# Patient Record
Sex: MALE | URBAN - METROPOLITAN AREA
[De-identification: names, ages, dates, MRNs, and addresses within clinical notes are randomized per-mention and may not be internally consistent; named-entity substitution may affect disease eponyms.]

---

## 2020-07-25 ENCOUNTER — TELEPHONE ENCOUNTER (OUTPATIENT)
Dept: URBAN - METROPOLITAN AREA CLINIC 13 | Facility: CLINIC | Age: 55
End: 2020-07-25

## 2020-07-25 RX ORDER — POLYETHYLENE GLYCOL 3350, SODIUM SULFATE, SODIUM CHLORIDE, POTASSIUM CHLORIDE, ASCORBIC ACID, SODIUM ASCORBATE 7.5-2.691G
USE AS DIRECTED KIT ORAL
Qty: 1 | Refills: 0 | OUTPATIENT
Start: 2013-06-25 | End: 2013-08-29

## 2020-07-26 ENCOUNTER — TELEPHONE ENCOUNTER (OUTPATIENT)
Dept: URBAN - METROPOLITAN AREA CLINIC 13 | Facility: CLINIC | Age: 55
End: 2020-07-26

## 2020-07-26 RX ORDER — LOSARTAN POTASSIUM 100 MG/1
TAKE 1 TABLET DAILY TABLET, FILM COATED ORAL
Refills: 0 | Status: ACTIVE | COMMUNITY
Start: 2013-06-25

## 2020-07-26 RX ORDER — DEXTROSE 4 G
TAKE 1 TABLET DAILY AS NEEDED TABLET,CHEWABLE ORAL
Refills: 0 | Status: ACTIVE | COMMUNITY
Start: 2013-06-25

## 2020-07-26 RX ORDER — FLUTICASONE PROPIONATE 50 UG/1
SPRAY, METERED NASAL
Qty: 16 | Refills: 0 | Status: ACTIVE | COMMUNITY
Start: 2012-08-01

## 2020-07-26 RX ORDER — ALLOPURINOL 300 MG/1
TAKE 1 TABLET DAILY TABLET ORAL
Refills: 0 | Status: ACTIVE | COMMUNITY
Start: 2013-06-25

## 2020-07-26 RX ORDER — INDOMETHACIN 25 MG/1
TAKE 1 CAPSULE 3 TIMES DAILY WITH FOOD CAPSULE ORAL
Refills: 0 | Status: ACTIVE | COMMUNITY
Start: 2013-06-25

## 2024-01-08 ENCOUNTER — OFFICE VISIT (OUTPATIENT)
Dept: URBAN - METROPOLITAN AREA CLINIC 113 | Facility: CLINIC | Age: 59
End: 2024-01-08
Payer: COMMERCIAL

## 2024-01-08 ENCOUNTER — DASHBOARD ENCOUNTERS (OUTPATIENT)
Age: 59
End: 2024-01-08

## 2024-01-08 ENCOUNTER — LAB OUTSIDE AN ENCOUNTER (OUTPATIENT)
Dept: URBAN - METROPOLITAN AREA CLINIC 113 | Facility: CLINIC | Age: 59
End: 2024-01-08

## 2024-01-08 VITALS
DIASTOLIC BLOOD PRESSURE: 80 MMHG | RESPIRATION RATE: 20 BRPM | HEIGHT: 74 IN | BODY MASS INDEX: 36.83 KG/M2 | HEART RATE: 80 BPM | SYSTOLIC BLOOD PRESSURE: 130 MMHG | WEIGHT: 287 LBS | TEMPERATURE: 97.7 F

## 2024-01-08 DIAGNOSIS — K57.30 DIVERTICULOSIS OF COLON WITHOUT DIVERTICULITIS: ICD-10-CM

## 2024-01-08 DIAGNOSIS — Z12.11 COLON CANCER SCREENING: ICD-10-CM

## 2024-01-08 DIAGNOSIS — K62.5 RECTAL BLEEDING: ICD-10-CM

## 2024-01-08 PROBLEM — 398311004: Status: ACTIVE | Noted: 2024-01-08

## 2024-01-08 PROCEDURE — 99203 OFFICE O/P NEW LOW 30 MIN: CPT | Performed by: INTERNAL MEDICINE

## 2024-01-08 RX ORDER — UBIDECARENONE 30 MG
AS DIRECTED CAPSULE ORAL
Status: ACTIVE | COMMUNITY

## 2024-01-08 RX ORDER — FLUTICASONE PROPIONATE 50 UG/1
SPRAY, METERED NASAL
Qty: 16 | Refills: 0 | Status: ON HOLD | COMMUNITY
Start: 2012-08-01

## 2024-01-08 RX ORDER — LOSARTAN POTASSIUM 100 MG/1
TAKE 1 TABLET DAILY TABLET, FILM COATED ORAL
Refills: 0 | Status: ACTIVE | COMMUNITY
Start: 2013-06-25

## 2024-01-08 RX ORDER — DEXTROSE 4 G
TAKE 1 TABLET DAILY AS NEEDED TABLET,CHEWABLE ORAL
Refills: 0 | Status: ON HOLD | COMMUNITY
Start: 2013-06-25

## 2024-01-08 RX ORDER — INDOMETHACIN 25 MG/1
TAKE 1 CAPSULE 3 TIMES DAILY WITH FOOD CAPSULE ORAL
Refills: 0 | Status: ON HOLD | COMMUNITY
Start: 2013-06-25

## 2024-01-08 RX ORDER — ALLOPURINOL 300 MG/1
TAKE 1 TABLET DAILY TABLET ORAL
Refills: 0 | Status: ACTIVE | COMMUNITY
Start: 2013-06-25

## 2024-01-08 NOTE — HPI-TODAY'S VISIT:
The patient is a very pleasant 58-year-old gentleman who returns after long interval absence.  The patient was seen by me in August 2013 for colonoscopy for screening purposes.  This revealed diverticulosis but otherwise unremarkable exam. Referring records were reviewed.  Laboratory testing from June 2022 revealed a normal CBC, normal CMP, low testosterone, normal TSH The patient comes in now because last week he had 4 to 5 days straight of bright red blood per rectum especially with wiping.  He has no significant constipation. He states of the last 10 years since have seen him his only new significant past medical and past surgical history was a right total knee replacement.  He states he remains in excellent health with no cardiac or pulmonary issues.  No new family history of colon cancer developed.

## 2024-02-20 ENCOUNTER — LAB (OUTPATIENT)
Dept: URBAN - METROPOLITAN AREA CLINIC 4 | Facility: CLINIC | Age: 59
End: 2024-02-20
Payer: COMMERCIAL

## 2024-02-20 ENCOUNTER — COLON (OUTPATIENT)
Dept: URBAN - METROPOLITAN AREA SURGERY CENTER 25 | Facility: SURGERY CENTER | Age: 59
End: 2024-02-20
Payer: COMMERCIAL

## 2024-02-20 DIAGNOSIS — Z12.11 ENCOUNTER FOR SCREENING FOR MALIGNANT NEOPLASM OF COLON: ICD-10-CM

## 2024-02-20 DIAGNOSIS — D12.5 ADENOMA OF SIGMOID COLON: ICD-10-CM

## 2024-02-20 DIAGNOSIS — D12.0 ADENOMA OF CECUM: ICD-10-CM

## 2024-02-20 DIAGNOSIS — D12.0 BENIGN NEOPLASM OF CECUM: ICD-10-CM

## 2024-02-20 PROCEDURE — 45385 COLONOSCOPY W/LESION REMOVAL: CPT | Performed by: INTERNAL MEDICINE

## 2024-02-20 PROCEDURE — 88305 TISSUE EXAM BY PATHOLOGIST: CPT | Performed by: PATHOLOGY

## 2024-02-20 RX ORDER — INDOMETHACIN 25 MG/1
TAKE 1 CAPSULE 3 TIMES DAILY WITH FOOD CAPSULE ORAL
Refills: 0 | Status: ON HOLD | COMMUNITY
Start: 2013-06-25

## 2024-02-20 RX ORDER — ALLOPURINOL 300 MG/1
TAKE 1 TABLET DAILY TABLET ORAL
Refills: 0 | Status: ACTIVE | COMMUNITY
Start: 2013-06-25

## 2024-02-20 RX ORDER — LOSARTAN POTASSIUM 100 MG/1
TAKE 1 TABLET DAILY TABLET, FILM COATED ORAL
Refills: 0 | Status: ACTIVE | COMMUNITY
Start: 2013-06-25

## 2024-02-20 RX ORDER — FLUTICASONE PROPIONATE 50 UG/1
SPRAY, METERED NASAL
Qty: 16 | Refills: 0 | Status: ON HOLD | COMMUNITY
Start: 2012-08-01

## 2024-02-20 RX ORDER — DEXTROSE 4 G
TAKE 1 TABLET DAILY AS NEEDED TABLET,CHEWABLE ORAL
Refills: 0 | Status: ON HOLD | COMMUNITY
Start: 2013-06-25

## 2024-02-20 RX ORDER — UBIDECARENONE 30 MG
AS DIRECTED CAPSULE ORAL
Status: ACTIVE | COMMUNITY